# Patient Record
Sex: FEMALE | Race: WHITE | NOT HISPANIC OR LATINO | Employment: UNEMPLOYED | URBAN - METROPOLITAN AREA
[De-identification: names, ages, dates, MRNs, and addresses within clinical notes are randomized per-mention and may not be internally consistent; named-entity substitution may affect disease eponyms.]

---
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
## 2025-03-31 NOTE — ED NOTES
LOV 10/28/24   RTO 4/28/25  LRF 4//24          Controlled Substance Monitoring:    Acute and Chronic Pain Monitoring:        No data to display                      Resident in room to evaluate pt.
